# Patient Record
Sex: MALE | Race: BLACK OR AFRICAN AMERICAN | HISPANIC OR LATINO | URBAN - METROPOLITAN AREA
[De-identification: names, ages, dates, MRNs, and addresses within clinical notes are randomized per-mention and may not be internally consistent; named-entity substitution may affect disease eponyms.]

---

## 2017-11-09 VITALS
RESPIRATION RATE: 16 BRPM | HEART RATE: 81 BPM | OXYGEN SATURATION: 99 % | DIASTOLIC BLOOD PRESSURE: 80 MMHG | SYSTOLIC BLOOD PRESSURE: 160 MMHG

## 2017-11-09 NOTE — H&P ADULT - ASSESSMENT
Consented with use of  #[id number]  Risks & benefits of procedure and alternative therapy have been explained to the patient including but not limited to: allergic reaction, bleeding w/possible need for blood transfusion, infection, renal and vascular compromise, limb damage, arrhythmia, stroke, vessel dissection/perforation, Myocardial infarction, emergent CABG. Informed consent obtained and in chart. 78 yo Vincentian speaking male, former smoker, former heavy ETOH abuser (per Dr. Lynn's note-patient denies) PMHx HTN, Hyperlipidemia, +Pre-Diabetes, Aortic Valve Sclerosis, Mild to Moderate MR, chronic systolic CHF (EF 35%), COPD/Emphysema (no history of intubations or hospitalizations, no home Oxygen),  recent prostatitis tx with abx x 10 days, and left leg osteoarthritis who presents for recommended cardiac cath due to patient's risk factors, CCS Angina Class II Equivalent Symptoms, high risk stress test findings with severe Cardiomyopathy.    ASA III            Mallampati III    CBC stable. Patient loaded with  mg PO x 1 and Plavix 600 mg PO x 1.         Consented with use of  #[id number]  Risks & benefits of procedure and alternative therapy have been explained to the patient including but not limited to: allergic reaction, bleeding w/possible need for blood transfusion, infection, renal and vascular compromise, limb damage, arrhythmia, stroke, vessel dissection/perforation, Myocardial infarction, emergent CABG. Informed consent obtained and in chart. 76 yo Northern Irish speaking male, former smoker, former heavy ETOH abuser (per Dr. Lynn's note-patient denies) PMHx HTN, Hyperlipidemia, +Pre-Diabetes, Aortic Valve Sclerosis, Mild to Moderate MR, chronic systolic CHF (EF 35%), LBBB, COPD/Emphysema (no history of intubations or hospitalizations, no home Oxygen),  Prostatitis ~ 3 months ago tx with abx x 10 days, and left leg osteoarthritis (ambulates with cane/walker)who presents for recommended cardiac cath due to patient's risk factors, CCS Angina Class II Equivalent Symptoms, high risk stress test findings with severe Cardiomyopathy.    ASA III            Mallampati III    CBC stable. Patient loaded with  mg PO x 1 and Plavix 600 mg PO x 1.         Consented with use of  #[id number]  Risks & benefits of procedure and alternative therapy have been explained to the patient including but not limited to: allergic reaction, bleeding w/possible need for blood transfusion, infection, renal and vascular compromise, limb damage, arrhythmia, stroke, vessel dissection/perforation, Myocardial infarction, emergent CABG. Informed consent obtained and in chart.

## 2017-11-09 NOTE — H&P ADULT - NSHPLABSRESULTS_GEN_ALL_CORE
EKG: 13.3   6.5   )-----------( 176      ( 10 Nov 2017 09:28 )             38.9       11-10    138  |  103  |  13  ----------------------------<  121<H>  4.0   |  21<L>  |  1.16    Ca    10.5      10 Nov 2017 09:28    TPro  7.3  /  Alb  4.1  /  TBili  0.7  /  DBili  x   /  AST  16  /  ALT  13  /  AlkPhos  104  11-10      PT/INR - ( 10 Nov 2017 09:28 )   PT: 11.7 sec;   INR: 1.05          PTT - ( 10 Nov 2017 09:28 )  PTT:30.8 sec    CARDIAC MARKERS ( 10 Nov 2017 09:28 )  x     / x     / 62 U/L / x     / 1.3 ng/mL            EKG: 13.3   6.5   )-----------( 176      ( 10 Nov 2017 09:28 )             38.9       11-10    138  |  103  |  13  ----------------------------<  121<H>  4.0   |  21<L>  |  1.16    Ca    10.5      10 Nov 2017 09:28    TPro  7.3  /  Alb  4.1  /  TBili  0.7  /  DBili  x   /  AST  16  /  ALT  13  /  AlkPhos  104  11-10      PT/INR - ( 10 Nov 2017 09:28 )   PT: 11.7 sec;   INR: 1.05          PTT - ( 10 Nov 2017 09:28 )  PTT:30.8 sec    CARDIAC MARKERS ( 10 Nov 2017 09:28 )  x     / x     / 62 U/L / x     / 1.3 ng/mL            EKG: NSR with PACs and IVCD.

## 2017-11-09 NOTE — H&P ADULT - NEGATIVE NEUROLOGICAL SYMPTOMS
no tremors/no paresthesias/no syncope/no generalized seizures/no focal seizures/no weakness/no headache

## 2017-11-09 NOTE — H&P ADULT - NSHPSOCIALHISTORY_GEN_ALL_CORE
TOB: Quit >10 years ago. Used to smoke 1/2 ppd x20-25 years. Denies Illicit Drug Use  ETOH: Occasional per patient. Per Dr. Ortega's office note -former heavy ETOH abuser until 15 years ago

## 2017-11-09 NOTE — H&P ADULT - HISTORY OF PRESENT ILLNESS
SKELETON    THIS PT IS REGISTERED FOR 11/10/2019. Please have scheduling correct it.     78 yo male, current occasional smoker, PMHx HTN, Hyperlipidemia, chronic systolic CHF, Asthma, BPH, osteoarthritis presents to cardiologist endorsing dyspnea on exertion and left hip pain. History obtained from patient with help of  ID# [id number]    THIS PT IS REGISTERED FOR 11/10/2019. Please have scheduling correct it.     78 yo male, former smoker, PMHx HTN, Hyperlipidemia, chronic systolic CHF (EF 35%), Asthma, recent prostatitis tx with abx x 10 days, and left leg osteoarthritis presents to cardiologist endorsing dyspnea on exertion and left hip pain. THIS PT IS REGISTERED FOR 11/10/2019. Please have scheduling correct it.     76 yo Bengali speaking male, former smoker, former heavy ETOH abuser (per Dr. Lynn's note-patient denies) PMHx HTN, Hyperlipidemia, +Pre-Diabetes, Aortic Valve Sclerosis, chronic systolic CHF (EF 35%), COPD/Emphysema (no history of intubations or hospitalizations, no home Oxygen),  recent prostatitis tx with abx x 10 days, and left leg osteoarthritis who presented  to cardiologist c/o DE LA ROSA x ~ 6 months. Patient reports experiencing SOB after walking 2-3 blocks relieved with rest (CCS Angina Class II Equivalent Symptoms). Patient admits to occasional palpitations but denies C/P, dizziness, diaphoresis, N/V, syncope, LE edema, PND or orthopnea.     Echocardiogram 10/2017 revealed borderline concentric LVH and dilatation, diffuse hypokinesis/severe hypokinesis of mid to anterior distal septum and apex, diffuse hypokinesis of other segments, moderate to severe global dysfunction, estimated LVEF 35%, mild Diastolic Dysfunction, abnormal echo density at apex may represent organized, mural thrombus, mild thickening of MV leaflets with normal motion, mild Mitral annulus calcification, mild to moderate MR, aortic root dilatation, AV sclerosis with normal opening, trileaflet valve, left atrial enlargement, normal Right Atrium and RV, Trace TR, mild MA. 78 yo Faroese speaking male, former smoker, former heavy ETOH abuser (per Dr. Lynn's note-patient denies) PMHx HTN, Hyperlipidemia, +Pre-Diabetes, Aortic Valve Sclerosis, chronic systolic CHF (EF 35%), COPD/Emphysema (no history of intubations or hospitalizations, no home Oxygen),  recent prostatitis tx with abx x 10 days, and left leg osteoarthritis who presented  to cardiologist c/o DE LA ROSA x ~ 6 months. Patient reports experiencing SOB after walking 2-3 blocks relieved with rest (CCS Angina Class II Equivalent Symptoms). Patient admits to occasional palpitations but denies C/P, dizziness, diaphoresis, N/V, syncope, LE edema, PND or orthopnea.     Echocardiogram 10/2017 revealed borderline concentric LVH and dilatation, diffuse hypokinesis/severe hypokinesis of mid to anterior distal septum and apex, diffuse hypokinesis of other segments, moderate to severe global dysfunction, estimated LVEF 35%, mild Diastolic Dysfunction, abnormal echo density at apex may represent organized, mural thrombus, mild thickening of MV leaflets with normal motion, mild Mitral annulus calcification, mild to moderate MR, aortic root dilatation, AV sclerosis with normal opening, trileaflet valve, left atrial enlargement, normal Right Atrium and RV, Trace TR, mild CT. 78 yo Saudi Arabian speaking male, former smoker, former heavy ETOH abuser (per Dr. Lynn's note-patient denies) PMHx HTN, Hyperlipidemia, +Pre-Diabetes, Aortic Valve Sclerosis, Mild to Moderate MR, chronic systolic CHF (EF 35%), COPD/Emphysema (no history of intubations or hospitalizations, no home Oxygen),  recent prostatitis tx with abx x 10 days, and left leg osteoarthritis who presented  to cardiologist c/o DE LA ROSA x ~ 6 months. Patient reports experiencing SOB after walking 2-3 blocks relieved with rest (CCS Angina Class II Equivalent Symptoms). Patient admits to occasional palpitations but denies C/P, dizziness, diaphoresis, N/V, syncope, LE edema, PND or orthopnea.     Echocardiogram 10/2017 revealed borderline concentric LVH and dilatation, diffuse hypokinesis/severe hypokinesis of mid to anterior distal septum and apex, diffuse hypokinesis of other segments, moderate to severe global dysfunction, estimated LVEF 35%, mild Diastolic Dysfunction, abnormal echo density at apex may represent organized, mural thrombus, mild thickening of MV leaflets with normal motion, mild Mitral annulus calcification, mild to moderate MR, aortic root dilatation, AV sclerosis with normal opening, trileaflet valve, left atrial enlargement, normal Right Atrium and RV, Trace TR, mild LA.   Pharmacological Stress Test 11/2/17 revealed LVEF 28%, findings consistent with myocardial scarring involving mid to basal lateral wall with a significant ankit-infarct ischemia, akinesis of mid to basal lateral wall and there is moderate LV dilataton, normal RV size and wall motion. In light of patient's risk factors, CCS Angina Class II Equivalent Symptoms, high risk stress test findings with Cardiomyopathy patient now presents for recommended cardiac cath with possible intervention. History obtained from patient (reliable) daughter Clayton (reliable) and Dr. Ortega's office notes     76 yo Maori speaking male, former smoker, former heavy ETOH abuser (per Dr. Lynn's note-patient denies) PMHx HTN, Hyperlipidemia, +Pre-Diabetes, Aortic Valve Sclerosis, Mild to Moderate MR, chronic systolic CHF (EF 35%), LBBB, COPD/Emphysema (no history of intubations or hospitalizations, no home Oxygen),  Prostatitis ~ 3 months ago tx with abx x 10 days, and left leg osteoarthritis (ambulates with cane/walker) who presented  to cardiologist c/o DE LA ROSA x ~ 6 months. Patient reports experiencing SOB after walking 2-3 blocks relieved with rest (CCS Angina Class II Equivalent Symptoms). Patient admits to occasional palpitations but denies C/P, dizziness, diaphoresis, N/V, syncope, LE edema, PND or orthopnea.   Holter Monitor 2017 revealed rare PVCs, frequent SVES, short SVT longest 5 beats.   Echocardiogram 10/2017 revealed borderline concentric LVH and dilatation, diffuse hypokinesis/severe hypokinesis of mid to anterior distal septum and apex, diffuse hypokinesis of other segments, moderate to severe global dysfunction, estimated LVEF 35%, mild Diastolic Dysfunction, abnormal echo density at apex may represent organized, mural thrombus, mild thickening of MV leaflets with normal motion, mild Mitral annulus calcification, mild to moderate MR, aortic root dilatation, AV sclerosis with normal opening, trileaflet valve, left atrial enlargement, normal Right Atrium and RV, Trace TR, mild SC.   Pharmacological Stress Test 11/2/17 revealed LVEF 28%, findings consistent with myocardial scarring involving mid to basal lateral wall with a significant ankit-infarct ischemia, akinesis of mid to basal lateral wall and there is moderate LV dilataton, normal RV size and wall motion. In light of patient's risk factors, CCS Angina Class II Equivalent Symptoms, high risk stress test findings with Cardiomyopathy patient now presents for recommended cardiac cath with possible intervention.

## 2017-11-10 ENCOUNTER — INPATIENT (INPATIENT)
Facility: HOSPITAL | Age: 77
LOS: 0 days | Discharge: HOME | DRG: 247 | End: 2017-11-11
Attending: INTERNAL MEDICINE | Admitting: INTERNAL MEDICINE
Payer: MEDICARE

## 2017-11-10 DIAGNOSIS — Z98.890 OTHER SPECIFIED POSTPROCEDURAL STATES: Chronic | ICD-10-CM

## 2017-11-10 LAB
ALBUMIN SERPL ELPH-MCNC: 4.1 G/DL — SIGNIFICANT CHANGE UP (ref 3.3–5)
ALP SERPL-CCNC: 104 U/L — SIGNIFICANT CHANGE UP (ref 40–120)
ALT FLD-CCNC: 13 U/L — SIGNIFICANT CHANGE UP (ref 10–45)
ANION GAP SERPL CALC-SCNC: 14 MMOL/L — SIGNIFICANT CHANGE UP (ref 5–17)
APTT BLD: 30.8 SEC — SIGNIFICANT CHANGE UP (ref 27.5–37.4)
AST SERPL-CCNC: 16 U/L — SIGNIFICANT CHANGE UP (ref 10–40)
BASOPHILS NFR BLD AUTO: 0.6 % — SIGNIFICANT CHANGE UP (ref 0–2)
BILIRUB SERPL-MCNC: 0.7 MG/DL — SIGNIFICANT CHANGE UP (ref 0.2–1.2)
BUN SERPL-MCNC: 13 MG/DL — SIGNIFICANT CHANGE UP (ref 7–23)
CALCIUM SERPL-MCNC: 10.5 MG/DL — SIGNIFICANT CHANGE UP (ref 8.4–10.5)
CHLORIDE SERPL-SCNC: 103 MMOL/L — SIGNIFICANT CHANGE UP (ref 96–108)
CHOLEST SERPL-MCNC: 141 MG/DL — SIGNIFICANT CHANGE UP (ref 10–199)
CK MB CFR SERPL CALC: 1.3 NG/ML — SIGNIFICANT CHANGE UP (ref 0–6.7)
CK SERPL-CCNC: 62 U/L — SIGNIFICANT CHANGE UP (ref 30–200)
CO2 SERPL-SCNC: 21 MMOL/L — LOW (ref 22–31)
CREAT SERPL-MCNC: 1.16 MG/DL — SIGNIFICANT CHANGE UP (ref 0.5–1.3)
CRP SERPL-MCNC: 0.1 MG/DL — SIGNIFICANT CHANGE UP (ref 0–0.4)
EOSINOPHIL NFR BLD AUTO: 1.7 % — SIGNIFICANT CHANGE UP (ref 0–6)
GLUCOSE SERPL-MCNC: 121 MG/DL — HIGH (ref 70–99)
HBA1C BLD-MCNC: 5.8 % — HIGH (ref 4–5.6)
HCT VFR BLD CALC: 38.9 % — LOW (ref 39–50)
HDLC SERPL-MCNC: 40 MG/DL — SIGNIFICANT CHANGE UP (ref 40–125)
HGB BLD-MCNC: 13.3 G/DL — SIGNIFICANT CHANGE UP (ref 13–17)
INR BLD: 1.05 — SIGNIFICANT CHANGE UP (ref 0.88–1.16)
LIPID PNL WITH DIRECT LDL SERPL: 69 MG/DL — SIGNIFICANT CHANGE UP
LYMPHOCYTES # BLD AUTO: 29.2 % — SIGNIFICANT CHANGE UP (ref 13–44)
MCHC RBC-ENTMCNC: 28.7 PG — SIGNIFICANT CHANGE UP (ref 27–34)
MCHC RBC-ENTMCNC: 34.2 G/DL — SIGNIFICANT CHANGE UP (ref 32–36)
MCV RBC AUTO: 84 FL — SIGNIFICANT CHANGE UP (ref 80–100)
MONOCYTES NFR BLD AUTO: 4.5 % — SIGNIFICANT CHANGE UP (ref 2–14)
NEUTROPHILS NFR BLD AUTO: 64 % — SIGNIFICANT CHANGE UP (ref 43–77)
PLATELET # BLD AUTO: 176 K/UL — SIGNIFICANT CHANGE UP (ref 150–400)
POTASSIUM SERPL-MCNC: 4 MMOL/L — SIGNIFICANT CHANGE UP (ref 3.5–5.3)
POTASSIUM SERPL-SCNC: 4 MMOL/L — SIGNIFICANT CHANGE UP (ref 3.5–5.3)
PROT SERPL-MCNC: 7.3 G/DL — SIGNIFICANT CHANGE UP (ref 6–8.3)
PROTHROM AB SERPL-ACNC: 11.7 SEC — SIGNIFICANT CHANGE UP (ref 9.8–12.7)
RBC # BLD: 4.63 M/UL — SIGNIFICANT CHANGE UP (ref 4.2–5.8)
RBC # FLD: 13 % — SIGNIFICANT CHANGE UP (ref 10.3–16.9)
SODIUM SERPL-SCNC: 138 MMOL/L — SIGNIFICANT CHANGE UP (ref 135–145)
TOTAL CHOLESTEROL/HDL RATIO MEASUREMENT: 3.5 RATIO — SIGNIFICANT CHANGE UP (ref 3.4–9.6)
TRIGL SERPL-MCNC: 159 MG/DL — HIGH (ref 10–149)
WBC # BLD: 6.5 K/UL — SIGNIFICANT CHANGE UP (ref 3.8–10.5)
WBC # FLD AUTO: 6.5 K/UL — SIGNIFICANT CHANGE UP (ref 3.8–10.5)

## 2017-11-10 PROCEDURE — 93010 ELECTROCARDIOGRAM REPORT: CPT

## 2017-11-10 RX ORDER — ATORVASTATIN CALCIUM 80 MG/1
20 TABLET, FILM COATED ORAL AT BEDTIME
Qty: 0 | Refills: 0 | Status: DISCONTINUED | OUTPATIENT
Start: 2017-11-10 | End: 2017-11-11

## 2017-11-10 RX ORDER — CLOPIDOGREL BISULFATE 75 MG/1
600 TABLET, FILM COATED ORAL ONCE
Qty: 0 | Refills: 0 | Status: COMPLETED | OUTPATIENT
Start: 2017-11-10 | End: 2017-11-10

## 2017-11-10 RX ORDER — SODIUM CHLORIDE 9 MG/ML
500 INJECTION, SOLUTION INTRAVENOUS
Qty: 0 | Refills: 0 | Status: DISCONTINUED | OUTPATIENT
Start: 2017-11-10 | End: 2017-11-10

## 2017-11-10 RX ORDER — DOCUSATE SODIUM 100 MG
100 CAPSULE ORAL DAILY
Qty: 0 | Refills: 0 | Status: DISCONTINUED | OUTPATIENT
Start: 2017-11-10 | End: 2017-11-11

## 2017-11-10 RX ORDER — ASPIRIN/CALCIUM CARB/MAGNESIUM 324 MG
81 TABLET ORAL DAILY
Qty: 0 | Refills: 0 | Status: DISCONTINUED | OUTPATIENT
Start: 2017-11-11 | End: 2017-11-11

## 2017-11-10 RX ORDER — CHLORHEXIDINE GLUCONATE 213 G/1000ML
1 SOLUTION TOPICAL ONCE
Qty: 0 | Refills: 0 | Status: DISCONTINUED | OUTPATIENT
Start: 2017-11-10 | End: 2017-11-10

## 2017-11-10 RX ORDER — METOPROLOL TARTRATE 50 MG
25 TABLET ORAL DAILY
Qty: 0 | Refills: 0 | Status: DISCONTINUED | OUTPATIENT
Start: 2017-11-10 | End: 2017-11-11

## 2017-11-10 RX ORDER — SENNA PLUS 8.6 MG/1
2 TABLET ORAL AT BEDTIME
Qty: 0 | Refills: 0 | Status: DISCONTINUED | OUTPATIENT
Start: 2017-11-10 | End: 2017-11-11

## 2017-11-10 RX ORDER — SODIUM CHLORIDE 9 MG/ML
500 INJECTION INTRAMUSCULAR; INTRAVENOUS; SUBCUTANEOUS
Qty: 0 | Refills: 0 | Status: DISCONTINUED | OUTPATIENT
Start: 2017-11-10 | End: 2017-11-11

## 2017-11-10 RX ORDER — CLOPIDOGREL BISULFATE 75 MG/1
75 TABLET, FILM COATED ORAL DAILY
Qty: 0 | Refills: 0 | Status: DISCONTINUED | OUTPATIENT
Start: 2017-11-11 | End: 2017-11-11

## 2017-11-10 RX ORDER — VALSARTAN 80 MG/1
80 TABLET ORAL DAILY
Qty: 0 | Refills: 0 | Status: DISCONTINUED | OUTPATIENT
Start: 2017-11-10 | End: 2017-11-11

## 2017-11-10 RX ORDER — ISOSORBIDE MONONITRATE 60 MG/1
30 TABLET, EXTENDED RELEASE ORAL DAILY
Qty: 0 | Refills: 0 | Status: DISCONTINUED | OUTPATIENT
Start: 2017-11-10 | End: 2017-11-11

## 2017-11-10 RX ORDER — INFLUENZA VIRUS VACCINE 15; 15; 15; 15 UG/.5ML; UG/.5ML; UG/.5ML; UG/.5ML
0.5 SUSPENSION INTRAMUSCULAR ONCE
Qty: 0 | Refills: 0 | Status: COMPLETED | OUTPATIENT
Start: 2017-11-10 | End: 2017-11-10

## 2017-11-10 RX ORDER — ASPIRIN/CALCIUM CARB/MAGNESIUM 324 MG
325 TABLET ORAL ONCE
Qty: 0 | Refills: 0 | Status: COMPLETED | OUTPATIENT
Start: 2017-11-10 | End: 2017-11-10

## 2017-11-10 RX ADMIN — SODIUM CHLORIDE 40 MILLILITER(S): 9 INJECTION, SOLUTION INTRAVENOUS at 10:20

## 2017-11-10 RX ADMIN — ISOSORBIDE MONONITRATE 30 MILLIGRAM(S): 60 TABLET, EXTENDED RELEASE ORAL at 16:20

## 2017-11-10 RX ADMIN — Medication 100 MILLIGRAM(S): at 16:19

## 2017-11-10 RX ADMIN — Medication 25 MILLIGRAM(S): at 16:20

## 2017-11-10 RX ADMIN — CLOPIDOGREL BISULFATE 600 MILLIGRAM(S): 75 TABLET, FILM COATED ORAL at 10:15

## 2017-11-10 RX ADMIN — SENNA PLUS 2 TABLET(S): 8.6 TABLET ORAL at 22:16

## 2017-11-10 RX ADMIN — ATORVASTATIN CALCIUM 20 MILLIGRAM(S): 80 TABLET, FILM COATED ORAL at 22:16

## 2017-11-10 RX ADMIN — SODIUM CHLORIDE 50 MILLILITER(S): 9 INJECTION INTRAMUSCULAR; INTRAVENOUS; SUBCUTANEOUS at 16:09

## 2017-11-10 RX ADMIN — Medication 325 MILLIGRAM(S): at 10:15

## 2017-11-10 RX ADMIN — VALSARTAN 80 MILLIGRAM(S): 80 TABLET ORAL at 17:03

## 2017-11-11 VITALS
SYSTOLIC BLOOD PRESSURE: 132 MMHG | RESPIRATION RATE: 16 BRPM | OXYGEN SATURATION: 98 % | HEART RATE: 88 BPM | DIASTOLIC BLOOD PRESSURE: 76 MMHG

## 2017-11-11 LAB
ANION GAP SERPL CALC-SCNC: 13 MMOL/L — SIGNIFICANT CHANGE UP (ref 5–17)
BUN SERPL-MCNC: 13 MG/DL — SIGNIFICANT CHANGE UP (ref 7–23)
CALCIUM SERPL-MCNC: 10.1 MG/DL — SIGNIFICANT CHANGE UP (ref 8.4–10.5)
CHLORIDE SERPL-SCNC: 103 MMOL/L — SIGNIFICANT CHANGE UP (ref 96–108)
CO2 SERPL-SCNC: 21 MMOL/L — LOW (ref 22–31)
CREAT SERPL-MCNC: 1.08 MG/DL — SIGNIFICANT CHANGE UP (ref 0.5–1.3)
GLUCOSE SERPL-MCNC: 95 MG/DL — SIGNIFICANT CHANGE UP (ref 70–99)
HCT VFR BLD CALC: 36.2 % — LOW (ref 39–50)
HGB BLD-MCNC: 11.9 G/DL — LOW (ref 13–17)
MAGNESIUM SERPL-MCNC: 2.1 MG/DL — SIGNIFICANT CHANGE UP (ref 1.6–2.6)
MCHC RBC-ENTMCNC: 27.9 PG — SIGNIFICANT CHANGE UP (ref 27–34)
MCHC RBC-ENTMCNC: 32.9 G/DL — SIGNIFICANT CHANGE UP (ref 32–36)
MCV RBC AUTO: 84.8 FL — SIGNIFICANT CHANGE UP (ref 80–100)
PLATELET # BLD AUTO: 171 K/UL — SIGNIFICANT CHANGE UP (ref 150–400)
POTASSIUM SERPL-MCNC: 4 MMOL/L — SIGNIFICANT CHANGE UP (ref 3.5–5.3)
POTASSIUM SERPL-SCNC: 4 MMOL/L — SIGNIFICANT CHANGE UP (ref 3.5–5.3)
RBC # BLD: 4.27 M/UL — SIGNIFICANT CHANGE UP (ref 4.2–5.8)
RBC # FLD: 13.4 % — SIGNIFICANT CHANGE UP (ref 10.3–16.9)
SODIUM SERPL-SCNC: 137 MMOL/L — SIGNIFICANT CHANGE UP (ref 135–145)
WBC # BLD: 6 K/UL — SIGNIFICANT CHANGE UP (ref 3.8–10.5)
WBC # FLD AUTO: 6 K/UL — SIGNIFICANT CHANGE UP (ref 3.8–10.5)

## 2017-11-11 PROCEDURE — 93010 ELECTROCARDIOGRAM REPORT: CPT

## 2017-11-11 RX ORDER — ATORVASTATIN CALCIUM 80 MG/1
1 TABLET, FILM COATED ORAL
Qty: 30 | Refills: 3 | OUTPATIENT
Start: 2017-11-11 | End: 2018-03-10

## 2017-11-11 RX ORDER — ATORVASTATIN CALCIUM 80 MG/1
1 TABLET, FILM COATED ORAL
Qty: 0 | Refills: 0 | COMMUNITY

## 2017-11-11 RX ORDER — CLOPIDOGREL BISULFATE 75 MG/1
1 TABLET, FILM COATED ORAL
Qty: 30 | Refills: 11 | OUTPATIENT
Start: 2017-11-11 | End: 2018-11-05

## 2017-11-11 RX ADMIN — Medication 81 MILLIGRAM(S): at 11:41

## 2017-11-11 RX ADMIN — VALSARTAN 80 MILLIGRAM(S): 80 TABLET ORAL at 06:22

## 2017-11-11 RX ADMIN — ISOSORBIDE MONONITRATE 30 MILLIGRAM(S): 60 TABLET, EXTENDED RELEASE ORAL at 11:41

## 2017-11-11 RX ADMIN — CLOPIDOGREL BISULFATE 75 MILLIGRAM(S): 75 TABLET, FILM COATED ORAL at 11:41

## 2017-11-11 RX ADMIN — Medication 100 MILLIGRAM(S): at 11:41

## 2017-11-11 RX ADMIN — Medication 25 MILLIGRAM(S): at 06:22

## 2017-11-11 NOTE — DISCHARGE NOTE ADULT - CARE PROVIDER_API CALL
Adelso Lieberman), Cardiovascular Disease; Internal Medicine; Interventional Cardiology  [address] [place] [address]  Phone: [phone]  Fax: [phone]

## 2017-11-11 NOTE — DISCHARGE NOTE ADULT - CARE PLAN
Principal Discharge DX:	Coronary artery disease  Goal:	You have blockages in the blood vessels that give blood and oxygen to your heart. This is called CORONARY ARTERY DISEASE. You got a drug eluting stent to your proximal right coronary artery. You have another blockage in your left circumflex artery that you need to come back for another procedure in 5 weeks to get more stents. It is VERY IMPORTANT that you take Aspirin 81mg once daily and Plavix (Clopidogrel) 75mg once daily to keep your stent open. IF YOU DO NOT TAKE THESE MEDIATIONS EVERY SINGLE DAY YOUR STENT CAN CLOSE AND YOU CAN HAVE A HEART ATTACK AND POSSIBLY DIE.  Instructions for follow-up, activity and diet:	Right groin wound care: do not lift objects heavier than 5 lbs for 5 days. Observe for signs of bleeding which include bleeding/sudden swelling to your right groin site, new/worsening back pain, or numbness/tingling/pain/coolness to your right leg/foot/toes. If you experiences these symptoms call your doctor and go to the nearest ED immediately. Follow up with Dr. Ortega in 1-2 weeks.    PLEASE  YOUR PLAVIX (CLOPIDOGREL) 75MG ONCE DAILY PRESCRIPTION FROM YOUR PHARMACY TODAY. THEY ARE OPEN UNTIL 6PM AND ARE NOT OPEN TOMORROW.  Secondary Diagnosis:	Hypertension  Goal:	Continue home blood pressure medications.  Secondary Diagnosis:	Hyperlipidemia  Goal:	Continue home dose of cholesterol medication. Principal Discharge DX:	Coronary artery disease  Goal:	You have blockages in the blood vessels that give blood and oxygen to your heart. This is called CORONARY ARTERY DISEASE. You got a drug eluting stent to your proximal right coronary artery. You have another blockage in your left circumflex artery that you need to come back for another procedure in 5 weeks to get more stents. It is VERY IMPORTANT that you take Aspirin 81mg once daily and Plavix (Clopidogrel) 75mg once daily to keep your stent open. IF YOU DO NOT TAKE THESE MEDIATIONS EVERY SINGLE DAY YOUR STENT CAN CLOSE AND YOU CAN HAVE A HEART ATTACK AND POSSIBLY DIE.  Instructions for follow-up, activity and diet:	Right groin wound care: do not lift objects heavier than 5 lbs for 5 days. Observe for signs of bleeding which include bleeding/sudden swelling to your right groin site, new/worsening back pain, or numbness/tingling/pain/coolness to your right leg/foot/toes. If you experiences these symptoms call your doctor and go to the nearest ED immediately. Follow up with Dr. Ortega in 1-2 weeks.    PLEASE  YOUR PLAVIX (CLOPIDOGREL) 75MG ONCE DAILY PRESCRIPTION FROM YOUR PHARMACY TODAY. THEY ARE OPEN UNTIL 6PM AND ARE NOT OPEN TOMORROW.  Secondary Diagnosis:	Hypertension  Goal:	Continue home blood pressure medications.  Secondary Diagnosis:	Hyperlipidemia  Goal:	We are increasing the dose of your cholesterol medication called ATORVASTATIN. STOP Atorvastatin 20mg once daily at bedtime. START Atorvastatin 40mg once daily at bedtime.

## 2017-11-11 NOTE — DISCHARGE NOTE ADULT - HOSPITAL COURSE
76 yo Senegalese speaking male, former smoker, former heavy ETOH abuser (per Dr. Lynn's note-patient denies) PMHx HTN, Hyperlipidemia, +Pre-Diabetes, Aortic Valve Sclerosis, Mild to Moderate MR, chronic systolic CHF (EF 35%), LBBB, COPD/Emphysema (no history of intubations or hospitalizations, no home Oxygen),  Prostatitis ~ 3 months ago tx with abx x 10 days, and left leg osteoarthritis (ambulates with cane/walker) who presented  to cardiologist c/o DE LA ROSA x ~ 6 months. Patient reports experiencing SOB after walking 2-3 blocks relieved with rest (CCS Angina Class II Equivalent Symptoms). Patient admits to occasional palpitations but denies C/P, dizziness, diaphoresis, N/V, syncope, LE edema, PND or orthopnea. Pt now s/p cardiac cath on 11/10/17 with IVUS/PTCA/GILBERTO-->pRCA. EF:35% (by Echo 10/2017). Right groin PC. Staged PCI of Lcx  if clinically indicated in 5 weeks. On 11/11 AM pt's VSS, pt asymptomatic. pt had sinus bradycardia at 345am with PACs and an episode of sinus tachycardia to 120s at 8pm on 11/10. Pt's labs stable. Pt stable for discharge as per Dr. Barnett. Pt will be discharged on Aspirin 81mg daily/ Plavix 75mg daily/ Toprol XL 25mg daily/ Valsartan 80mg daily/ Atorvastatin 20mg daily at bedtime. Pt's LDL 69. Pt will follow up with Dr. Ortega in 1-2 weeks. 78 yo Nauruan speaking male, former smoker, former heavy ETOH abuser (per Dr. Lynn's note-patient denies) PMHx HTN, Hyperlipidemia, +Pre-Diabetes, Aortic Valve Sclerosis, Mild to Moderate MR, chronic systolic CHF (EF 35%), LBBB, COPD/Emphysema (no history of intubations or hospitalizations, no home Oxygen),  Prostatitis ~ 3 months ago tx with abx x 10 days, and left leg osteoarthritis (ambulates with cane/walker) who presented  to cardiologist c/o DE LA ROSA x ~ 6 months. Patient reports experiencing SOB after walking 2-3 blocks relieved with rest (CCS Angina Class II Equivalent Symptoms). Patient admits to occasional palpitations but denies C/P, dizziness, diaphoresis, N/V, syncope, LE edema, PND or orthopnea. Pt now s/p cardiac cath on 11/10/17 with IVUS/PTCA/GILBERTO-->pRCA. EF:35% (by Echo 10/2017). Right groin PC. Staged PCI of Lcx  if clinically indicated in 5 weeks. On 11/11 AM pt's VSS, pt asymptomatic. pt had sinus bradycardia at 345am with PACs and an episode of sinus tachycardia to 120s at 8pm on 11/10. Pt's labs stable. Pt stable for discharge as per Dr. Barnett. Pt will be discharged on Aspirin 81mg daily/ Plavix 75mg daily/ Toprol XL 25mg daily/ Valsartan 80mg daily/ Atorvastatin 40mg daily at bedtime. Pt will follow up with Dr. Ortega in 1-2 weeks.

## 2017-11-11 NOTE — DISCHARGE NOTE ADULT - PATIENT PORTAL LINK FT
“You can access the [facility] Patient Portal, offered by [facility], by registering with the following website: [email or web address]”

## 2017-11-11 NOTE — DISCHARGE NOTE ADULT - MEDICATION SUMMARY - MEDICATIONS TO TAKE
I will START or STAY ON the medications listed below when I get home from the hospital:    aspirin 81 mg oral tablet  -- 1 tab(s) by mouth once a day  -- Indication: For Coronary artery disease/KEEPING YOUR STENT OPEN    valsartan 80 mg oral tablet  -- 1 tab(s) by mouth once a day  -- Indication: For Blood pressure control    Imdur 30 mg oral tablet, extended release  -- 1 tab(s) by mouth once a day (in the morning)  -- Indication: For Chest pain    atorvastatin 20 mg oral tablet  -- 1 tab(s) by mouth once a day  -- Indication: For Cholesterol control    clopidogrel 75 mg oral tablet  -- 1 tab(s) by mouth once a day  -- Indication: For Coronary artery disease/KEEPING YOUR STENT OPEN    Toprol-XL 25 mg oral tablet, extended release  -- 1 tab(s) by mouth once a day  -- Indication: For Blood pressure control

## 2017-11-11 NOTE — DISCHARGE NOTE ADULT - PLAN OF CARE
You have blockages in the blood vessels that give blood and oxygen to your heart. This is called CORONARY ARTERY DISEASE. You got a drug eluting stent to your proximal right coronary artery. You have another blockage in your left circumflex artery that you need to come back for another procedure in 5 weeks to get more stents. It is VERY IMPORTANT that you take Aspirin 81mg once daily and Plavix (Clopidogrel) 75mg once daily to keep your stent open. IF YOU DO NOT TAKE THESE MEDIATIONS EVERY SINGLE DAY YOUR STENT CAN CLOSE AND YOU CAN HAVE A HEART ATTACK AND POSSIBLY DIE. Right groin wound care: do not lift objects heavier than 5 lbs for 5 days. Observe for signs of bleeding which include bleeding/sudden swelling to your right groin site, new/worsening back pain, or numbness/tingling/pain/coolness to your right leg/foot/toes. If you experiences these symptoms call your doctor and go to the nearest ED immediately. Follow up with Dr. Ortega in 1-2 weeks.    PLEASE  YOUR PLAVIX (CLOPIDOGREL) 75MG ONCE DAILY PRESCRIPTION FROM YOUR PHARMACY TODAY. THEY ARE OPEN UNTIL 6PM AND ARE NOT OPEN TOMORROW. Continue home blood pressure medications. Continue home dose of cholesterol medication. We are increasing the dose of your cholesterol medication called ATORVASTATIN. STOP Atorvastatin 20mg once daily at bedtime. START Atorvastatin 40mg once daily at bedtime.

## 2017-11-15 DIAGNOSIS — E78.5 HYPERLIPIDEMIA, UNSPECIFIED: ICD-10-CM

## 2017-11-15 DIAGNOSIS — I11.0 HYPERTENSIVE HEART DISEASE WITH HEART FAILURE: ICD-10-CM

## 2017-11-15 DIAGNOSIS — Z98.49 CATARACT EXTRACTION STATUS, UNSPECIFIED EYE: ICD-10-CM

## 2017-11-15 DIAGNOSIS — Z79.82 LONG TERM (CURRENT) USE OF ASPIRIN: ICD-10-CM

## 2017-11-15 DIAGNOSIS — I35.8 OTHER NONRHEUMATIC AORTIC VALVE DISORDERS: ICD-10-CM

## 2017-11-15 DIAGNOSIS — Z87.891 PERSONAL HISTORY OF NICOTINE DEPENDENCE: ICD-10-CM

## 2017-11-15 DIAGNOSIS — I44.7 LEFT BUNDLE-BRANCH BLOCK, UNSPECIFIED: ICD-10-CM

## 2017-11-15 DIAGNOSIS — I50.22 CHRONIC SYSTOLIC (CONGESTIVE) HEART FAILURE: ICD-10-CM

## 2017-11-15 DIAGNOSIS — R73.03 PREDIABETES: ICD-10-CM

## 2017-11-15 DIAGNOSIS — J44.9 CHRONIC OBSTRUCTIVE PULMONARY DISEASE, UNSPECIFIED: ICD-10-CM

## 2017-11-15 DIAGNOSIS — I42.9 CARDIOMYOPATHY, UNSPECIFIED: ICD-10-CM

## 2017-11-15 DIAGNOSIS — I25.82 CHRONIC TOTAL OCCLUSION OF CORONARY ARTERY: ICD-10-CM

## 2017-11-15 DIAGNOSIS — R06.02 SHORTNESS OF BREATH: ICD-10-CM

## 2017-11-15 DIAGNOSIS — I25.110 ATHEROSCLEROTIC HEART DISEASE OF NATIVE CORONARY ARTERY WITH UNSTABLE ANGINA PECTORIS: ICD-10-CM

## 2017-11-15 DIAGNOSIS — N41.9 INFLAMMATORY DISEASE OF PROSTATE, UNSPECIFIED: ICD-10-CM

## 2017-11-15 DIAGNOSIS — M19.072 PRIMARY OSTEOARTHRITIS, LEFT ANKLE AND FOOT: ICD-10-CM

## 2017-11-15 DIAGNOSIS — R00.0 TACHYCARDIA, UNSPECIFIED: ICD-10-CM

## 2017-11-15 DIAGNOSIS — F10.21 ALCOHOL DEPENDENCE, IN REMISSION: ICD-10-CM

## 2017-12-19 PROCEDURE — 36415 COLL VENOUS BLD VENIPUNCTURE: CPT

## 2017-12-19 PROCEDURE — 80061 LIPID PANEL: CPT

## 2017-12-19 PROCEDURE — 80048 BASIC METABOLIC PNL TOTAL CA: CPT

## 2017-12-19 PROCEDURE — C1874: CPT

## 2017-12-19 PROCEDURE — C1894: CPT

## 2017-12-19 PROCEDURE — C1753: CPT

## 2017-12-19 PROCEDURE — 85610 PROTHROMBIN TIME: CPT

## 2017-12-19 PROCEDURE — C1760: CPT

## 2017-12-19 PROCEDURE — C1769: CPT

## 2017-12-19 PROCEDURE — C1887: CPT

## 2017-12-19 PROCEDURE — C1889: CPT

## 2017-12-19 PROCEDURE — 83036 HEMOGLOBIN GLYCOSYLATED A1C: CPT

## 2017-12-19 PROCEDURE — 82550 ASSAY OF CK (CPK): CPT

## 2017-12-19 PROCEDURE — 85025 COMPLETE CBC W/AUTO DIFF WBC: CPT

## 2017-12-19 PROCEDURE — C1725: CPT

## 2017-12-19 PROCEDURE — 86140 C-REACTIVE PROTEIN: CPT

## 2017-12-19 PROCEDURE — 85027 COMPLETE CBC AUTOMATED: CPT

## 2017-12-19 PROCEDURE — 80053 COMPREHEN METABOLIC PANEL: CPT

## 2017-12-19 PROCEDURE — 82553 CREATINE MB FRACTION: CPT

## 2017-12-19 PROCEDURE — 85730 THROMBOPLASTIN TIME PARTIAL: CPT

## 2017-12-19 PROCEDURE — 83735 ASSAY OF MAGNESIUM: CPT

## 2017-12-19 PROCEDURE — 93005 ELECTROCARDIOGRAM TRACING: CPT

## 2018-08-14 VITALS
DIASTOLIC BLOOD PRESSURE: 51 MMHG | SYSTOLIC BLOOD PRESSURE: 158 MMHG | TEMPERATURE: 98 F | OXYGEN SATURATION: 98 % | HEART RATE: 76 BPM

## 2018-08-14 NOTE — H&P ADULT - NSHPLABSRESULTS_GEN_ALL_CORE
13.1   5.5   )-----------( 157      ( 16 Aug 2018 09:17 )             39.4       08-16    140  |  104  |  18  ----------------------------<  137<H>  4.5   |  22  |  1.31<H>    Ca    10.4      16 Aug 2018 09:17        PT/INR - ( 16 Aug 2018 09:17 )   PT: 11.3 sec;   INR: 1.02          PTT - ( 16 Aug 2018 09:17 )  PTT:29.0 sec              EKG: 13.1   5.5   )-----------( 157      ( 16 Aug 2018 09:17 )             39.4       08-16    140  |  104  |  18  ----------------------------<  137<H>  4.5   |  22  |  1.31<H>    Ca    10.4      16 Aug 2018 09:17        PT/INR - ( 16 Aug 2018 09:17 )   PT: 11.3 sec;   INR: 1.02          PTT - ( 16 Aug 2018 09:17 )  PTT:29.0 sec              EKG: SR with LBBB

## 2018-08-14 NOTE — H&P ADULT - NSHPSOCIALHISTORY_GEN_ALL_CORE
TOB: Quit >10 years ago. Used to smoke 1/2 ppd x20-25 years. Denies Illicit Drug Use  ETOH: former heavy ETOH abuser until 7-8 years ago  Denies IVDU

## 2018-08-14 NOTE — H&P ADULT - PMH
CAD (coronary artery disease)    CHF (congestive heart failure)    COPD (chronic obstructive pulmonary disease)    HLD (hyperlipidemia)    HTN (hypertension)

## 2018-08-14 NOTE — H&P ADULT - HISTORY OF PRESENT ILLNESS
***History obtained from patient via  # [id number]***    76 yo Zambian speaking male, former smoker, former ETOH abuser, with PMHx significant for HTN, Hyperlipidemia, Pre-Diabetes, Aortic Valve Sclerosis, Mild to Moderate MR, chronic systolic CHF (EF 35%), LBBB, COPD/Emphysema (no history of intubations or hospitalizations, no home Oxygen),  known CAD with prior PCI/GILBERTO of RCA on 11/10/17 @ [facility] and residual  of LCx (see below), who presented to his cardiologist for routine follow-up.  Pt denies any chest pain, SOB, dizziness, n/v, diaphoresis, orthopnea, PND, palpitations, syncope.  Subsequently, pt underwent a stress test which was abnormal as per pt.      In light of pt's risk factors, known CAD and recent positive stress test, he is recommended for cardiac cath with possible intervention for suspected CAD progression.     CATH 11/10/17 @ [facility]: LM nl, mild LAD <20, pCx 100, pRCA 70, Successful PCI/GILBERTO of pRCA.  Pt to return for evaluationfor  intervention.    Echocardiogram 10/2017 revealed borderline concentric LVH and dilatation, diffuse hypokinesis/severe hypokinesis of mid to anterior distal septum and apex, diffuse hypokinesis of other segments, moderate to severe global dysfunction, estimated LVEF 35%, mild Diastolic Dysfunction, abnormal echo density at apex may represent organized, mural thrombus, mild thickening of MV leaflets with normal motion, mild Mitral annulus calcification, mild to moderate MR, aortic root dilatation, AV sclerosis with normal opening, trileaflet valve, left atrial enlargement, normal Right Atrium and RV, Trace TR, mild [place].   Pharmacological Stress Test 11/2/17 revealed LVEF 28%, findings consistent with myocardial scarring involving mid to basal lateral wall with a significant ankit-infarct ischemia, akinesis of mid to basal lateral wall and there is moderate LV dilataton, normal RV size and wall motion. ***History obtained from patient via  # [id number]***    78 yo Malaysian speaking male, former smoker, former ETOH abuser, with PMHx significant for HTN, Hyperlipidemia, Pre-Diabetes, Aortic Valve Sclerosis, Mild to Moderate MR, chronic systolic CHF (EF 35%), LBBB, COPD/Emphysema (no history of intubations or hospitalizations, no home Oxygen),  known CAD with prior PCI/GILBERTO of RCA on 11/10/17 @ [facility] and residual  of LCx (see below), who presented to his cardiologist for routine follow-up.  Pt denies any chest pain, SOB, dizziness, n/v, diaphoresis, orthopnea, PND, palpitations, syncope.  Subsequently, pt underwent a stress test on 7/9/18 which revealed mid to basal lateral wall ankit-infarct ischemia, EF 36%.      In light of pt's risk factors, known CAD and recent positive stress test, he is recommended for cardiac cath with possible intervention for suspected CAD progression.     CATH 11/10/17 @ [facility]: LM nl, mild LAD <20, pCx 100, pRCA 70, Successful PCI/GILBERTO of pRCA.  Pt to return for evaluationfor  intervention.    Echocardiogram 10/2017 revealed borderline concentric LVH and dilatation, diffuse hypokinesis/severe hypokinesis of mid to anterior distal septum and apex, diffuse hypokinesis of other segments, moderate to severe global dysfunction, estimated LVEF 35%, mild Diastolic Dysfunction, abnormal echo density at apex may represent organized, mural thrombus, mild thickening of MV leaflets with normal motion, mild Mitral annulus calcification, mild to moderate MR, aortic root dilatation, AV sclerosis with normal opening, trileaflet valve, left atrial enlargement, normal Right Atrium and RV, Trace TR, mild [place].   Pharmacological Stress Test 11/2/17 revealed LVEF 28%, findings consistent with myocardial scarring involving mid to basal lateral wall with a significant ankit-infarct ischemia, akinesis of mid to basal lateral wall and there is moderate LV dilataton, normal RV size and wall motion. ***History obtained from patient via  # [id number]***    76 yo Liberian speaking male, former smoker, former ETOH abuser, with PMHx significant for HTN, Hyperlipidemia, Pre-Diabetes, CKD Stage III -Cr 1.31 on arrival previously 1.08-1.16 in 11/2017), Aortic Valve Sclerosis, Mild to Moderate MR, chronic systolic CHF (EF 35%), LBBB, COPD/Emphysema (no history of intubations or hospitalizations, no home Oxygen),  known CAD with prior PCI/GILBERTO of RCA on 11/10/17 @ [facility] and residual  of LCx (see below), who presented to his cardiologist for routine follow-up.  Pt denies any chest pain, SOB, dizziness, n/v, diaphoresis, orthopnea, PND, palpitations, syncope.  Subsequently, pt underwent a stress test on 7/9/18 which revealed mid to basal lateral wall ankit-infarct ischemia, EF 36%.      In light of pt's risk factors, known CAD and recent positive stress test, he is recommended for cardiac cath with possible intervention for suspected CAD progression.     CATH 11/10/17 @ [facility]: LM nl, mild LAD <20, pCx 100, pRCA 70, Successful PCI/GILBERTO of pRCA.  Pt to return for evaluationfor  intervention.    Echocardiogram 10/2017 revealed borderline concentric LVH and dilatation, diffuse hypokinesis/severe hypokinesis of mid to anterior distal septum and apex, diffuse hypokinesis of other segments, moderate to severe global dysfunction, estimated LVEF 35%, mild Diastolic Dysfunction, abnormal echo density at apex may represent organized, mural thrombus, mild thickening of MV leaflets with normal motion, mild Mitral annulus calcification, mild to moderate MR, aortic root dilatation, AV sclerosis with normal opening, trileaflet valve, left atrial enlargement, normal Right Atrium and RV, Trace TR, mild MS.   Pharmacological Stress Test 11/2/17 revealed LVEF 28%, findings consistent with myocardial scarring involving mid to basal lateral wall with a significant ankit-infarct ischemia, akinesis of mid to basal lateral wall and there is moderate LV dilatation, normal RV size and wall motion.

## 2018-08-14 NOTE — H&P ADULT - ASSESSMENT
78 yo Estonian speaking male, former smoker, former ETOH abuser, with PMHx significant for HTN, Hyperlipidemia, Pre-Diabetes, CKD Stage III -Cr 1.31 on arrival previously 1.08-1.16 in 11/2017), Aortic Valve Sclerosis, Mild to Moderate MR, chronic systolic CHF (EF 35%), LBBB, COPD/Emphysema (no history of intubations or hospitalizations, no home Oxygen),  known CAD with prior PCI/GILBERTO of RCA on 11/10/17 @ [facility] and residual  of LCx who presents for recommended cardiac cath due to patient's risk factors, known  LAD, and abnormal stress test.     ASA III                    Mallampati III  Risks & benefits of procedure and alternative therapy have been explained to the patient including but not limited to: allergic reaction, bleeding w/possible need for blood transfusion, infection, renal and vascular compromise, limb damage, arrhythmia, stroke, vessel dissection/perforation, Myocardial infarction, emergent CABG. Informed consent obtained and in chart.     Patient is on ASA 81 mg PO Daily and Plavix 75 mg PO Daily and reports compliance however patient did not take doses today. ASA  mg PO x 1 and Plavix 75 mg PO x 1 given prior to procedure.      History of Chronic Systolic CHF-EF 35%. Patient euvolemic on exam. No JVD. Lungs CTA. No edema. No respiratory distress and able to lay flat without issue. O2 sat normal. 1/2 NS 40 cc/hr ordered. CKD Stage III-Cr 1.31 previously patient with CKD Stage II in 11/2017- Cr 1.08-1.16 with GFR 60-65. Patient did not take his Losartan today. Dr. Jordan saul.

## 2018-08-16 ENCOUNTER — INPATIENT (INPATIENT)
Facility: HOSPITAL | Age: 78
LOS: 0 days | Discharge: HOME | DRG: 247 | End: 2018-08-17
Attending: INTERNAL MEDICINE | Admitting: INTERNAL MEDICINE
Payer: MEDICARE

## 2018-08-16 DIAGNOSIS — Z98.890 OTHER SPECIFIED POSTPROCEDURAL STATES: Chronic | ICD-10-CM

## 2018-08-16 LAB
ANION GAP SERPL CALC-SCNC: 14 MMOL/L — SIGNIFICANT CHANGE UP (ref 5–17)
APTT BLD: 29 SEC — SIGNIFICANT CHANGE UP (ref 27.5–37.4)
BASOPHILS NFR BLD AUTO: 1.1 % — SIGNIFICANT CHANGE UP (ref 0–2)
BUN SERPL-MCNC: 18 MG/DL — SIGNIFICANT CHANGE UP (ref 7–23)
CALCIUM SERPL-MCNC: 10.4 MG/DL — SIGNIFICANT CHANGE UP (ref 8.4–10.5)
CHLORIDE SERPL-SCNC: 104 MMOL/L — SIGNIFICANT CHANGE UP (ref 96–108)
CHOLEST SERPL-MCNC: 149 MG/DL — SIGNIFICANT CHANGE UP (ref 10–199)
CK MB CFR SERPL CALC: 3.1 NG/ML — SIGNIFICANT CHANGE UP (ref 0–6.7)
CK SERPL-CCNC: 211 U/L — HIGH (ref 30–200)
CO2 SERPL-SCNC: 22 MMOL/L — SIGNIFICANT CHANGE UP (ref 22–31)
CREAT SERPL-MCNC: 1.31 MG/DL — HIGH (ref 0.5–1.3)
CRP SERPL-MCNC: <0.03 MG/DL — SIGNIFICANT CHANGE UP (ref 0–0.4)
EOSINOPHIL NFR BLD AUTO: 1.3 % — SIGNIFICANT CHANGE UP (ref 0–6)
GLUCOSE SERPL-MCNC: 137 MG/DL — HIGH (ref 70–99)
HBA1C BLD-MCNC: 6.3 % — HIGH (ref 4–5.6)
HCT VFR BLD CALC: 39.4 % — SIGNIFICANT CHANGE UP (ref 39–50)
HDLC SERPL-MCNC: 42 MG/DL — SIGNIFICANT CHANGE UP
HGB BLD-MCNC: 13.1 G/DL — SIGNIFICANT CHANGE UP (ref 13–17)
INR BLD: 1.02 — SIGNIFICANT CHANGE UP (ref 0.88–1.16)
LIPID PNL WITH DIRECT LDL SERPL: 67 MG/DL — SIGNIFICANT CHANGE UP
LYMPHOCYTES # BLD AUTO: 32 % — SIGNIFICANT CHANGE UP (ref 13–44)
MCHC RBC-ENTMCNC: 28.4 PG — SIGNIFICANT CHANGE UP (ref 27–34)
MCHC RBC-ENTMCNC: 33.2 G/DL — SIGNIFICANT CHANGE UP (ref 32–36)
MCV RBC AUTO: 85.5 FL — SIGNIFICANT CHANGE UP (ref 80–100)
MONOCYTES NFR BLD AUTO: 6.9 % — SIGNIFICANT CHANGE UP (ref 2–14)
NEUTROPHILS NFR BLD AUTO: 58.7 % — SIGNIFICANT CHANGE UP (ref 43–77)
PLATELET # BLD AUTO: 157 K/UL — SIGNIFICANT CHANGE UP (ref 150–400)
POTASSIUM SERPL-MCNC: 4.5 MMOL/L — SIGNIFICANT CHANGE UP (ref 3.5–5.3)
POTASSIUM SERPL-SCNC: 4.5 MMOL/L — SIGNIFICANT CHANGE UP (ref 3.5–5.3)
PROTHROM AB SERPL-ACNC: 11.3 SEC — SIGNIFICANT CHANGE UP (ref 9.8–12.7)
RBC # BLD: 4.61 M/UL — SIGNIFICANT CHANGE UP (ref 4.2–5.8)
RBC # FLD: 13.2 % — SIGNIFICANT CHANGE UP (ref 10.3–16.9)
SODIUM SERPL-SCNC: 140 MMOL/L — SIGNIFICANT CHANGE UP (ref 135–145)
TOTAL CHOLESTEROL/HDL RATIO MEASUREMENT: 3.5 RATIO — SIGNIFICANT CHANGE UP (ref 3.4–9.6)
TRIGL SERPL-MCNC: 198 MG/DL — HIGH (ref 10–149)
WBC # BLD: 5.5 K/UL — SIGNIFICANT CHANGE UP (ref 3.8–10.5)
WBC # FLD AUTO: 5.5 K/UL — SIGNIFICANT CHANGE UP (ref 3.8–10.5)

## 2018-08-16 PROCEDURE — 93010 ELECTROCARDIOGRAM REPORT: CPT

## 2018-08-16 PROCEDURE — 92928 PRQ TCAT PLMT NTRAC ST 1 LES: CPT | Mod: LC

## 2018-08-16 PROCEDURE — 93458 L HRT ARTERY/VENTRICLE ANGIO: CPT | Mod: 26,XU

## 2018-08-16 RX ORDER — METOPROLOL TARTRATE 50 MG
1 TABLET ORAL
Qty: 0 | Refills: 0 | COMMUNITY

## 2018-08-16 RX ORDER — VALSARTAN 80 MG/1
1 TABLET ORAL
Qty: 0 | Refills: 0 | COMMUNITY

## 2018-08-16 RX ORDER — ATORVASTATIN CALCIUM 80 MG/1
80 TABLET, FILM COATED ORAL AT BEDTIME
Qty: 0 | Refills: 0 | Status: DISCONTINUED | OUTPATIENT
Start: 2018-08-16 | End: 2018-08-17

## 2018-08-16 RX ORDER — CLOPIDOGREL BISULFATE 75 MG/1
75 TABLET, FILM COATED ORAL ONCE
Qty: 0 | Refills: 0 | Status: COMPLETED | OUTPATIENT
Start: 2018-08-16 | End: 2018-08-16

## 2018-08-16 RX ORDER — ASPIRIN/CALCIUM CARB/MAGNESIUM 324 MG
325 TABLET ORAL ONCE
Qty: 0 | Refills: 0 | Status: COMPLETED | OUTPATIENT
Start: 2018-08-16 | End: 2018-08-16

## 2018-08-16 RX ORDER — CLOPIDOGREL BISULFATE 75 MG/1
75 TABLET, FILM COATED ORAL DAILY
Qty: 0 | Refills: 0 | Status: DISCONTINUED | OUTPATIENT
Start: 2018-08-17 | End: 2018-08-17

## 2018-08-16 RX ORDER — SODIUM CHLORIDE 9 MG/ML
500 INJECTION INTRAMUSCULAR; INTRAVENOUS; SUBCUTANEOUS
Qty: 0 | Refills: 0 | Status: DISCONTINUED | OUTPATIENT
Start: 2018-08-16 | End: 2018-08-17

## 2018-08-16 RX ORDER — METOPROLOL TARTRATE 50 MG
50 TABLET ORAL DAILY
Qty: 0 | Refills: 0 | Status: DISCONTINUED | OUTPATIENT
Start: 2018-08-16 | End: 2018-08-17

## 2018-08-16 RX ORDER — SODIUM CHLORIDE 9 MG/ML
500 INJECTION, SOLUTION INTRAVENOUS
Qty: 0 | Refills: 0 | Status: DISCONTINUED | OUTPATIENT
Start: 2018-08-16 | End: 2018-08-16

## 2018-08-16 RX ORDER — ISOSORBIDE MONONITRATE 60 MG/1
30 TABLET, EXTENDED RELEASE ORAL DAILY
Qty: 0 | Refills: 0 | Status: DISCONTINUED | OUTPATIENT
Start: 2018-08-16 | End: 2018-08-17

## 2018-08-16 RX ORDER — ASPIRIN/CALCIUM CARB/MAGNESIUM 324 MG
81 TABLET ORAL DAILY
Qty: 0 | Refills: 0 | Status: DISCONTINUED | OUTPATIENT
Start: 2018-08-17 | End: 2018-08-17

## 2018-08-16 RX ADMIN — SODIUM CHLORIDE 40 MILLILITER(S): 9 INJECTION, SOLUTION INTRAVENOUS at 10:07

## 2018-08-16 RX ADMIN — CLOPIDOGREL BISULFATE 75 MILLIGRAM(S): 75 TABLET, FILM COATED ORAL at 10:07

## 2018-08-16 RX ADMIN — Medication 50 MILLIGRAM(S): at 19:26

## 2018-08-16 RX ADMIN — ATORVASTATIN CALCIUM 80 MILLIGRAM(S): 80 TABLET, FILM COATED ORAL at 22:12

## 2018-08-16 RX ADMIN — Medication 325 MILLIGRAM(S): at 10:09

## 2018-08-16 RX ADMIN — ISOSORBIDE MONONITRATE 30 MILLIGRAM(S): 60 TABLET, EXTENDED RELEASE ORAL at 19:26

## 2018-08-17 VITALS — WEIGHT: 157.41 LBS

## 2018-08-17 LAB
ANION GAP SERPL CALC-SCNC: 8 MMOL/L — SIGNIFICANT CHANGE UP (ref 5–17)
BUN SERPL-MCNC: 17 MG/DL — SIGNIFICANT CHANGE UP (ref 7–23)
CALCIUM SERPL-MCNC: 10.1 MG/DL — SIGNIFICANT CHANGE UP (ref 8.4–10.5)
CHLORIDE SERPL-SCNC: 105 MMOL/L — SIGNIFICANT CHANGE UP (ref 96–108)
CO2 SERPL-SCNC: 26 MMOL/L — SIGNIFICANT CHANGE UP (ref 22–31)
CREAT SERPL-MCNC: 1.21 MG/DL — SIGNIFICANT CHANGE UP (ref 0.5–1.3)
GLUCOSE SERPL-MCNC: 117 MG/DL — HIGH (ref 70–99)
HCT VFR BLD CALC: 37.4 % — LOW (ref 39–50)
HGB BLD-MCNC: 12.2 G/DL — LOW (ref 13–17)
MAGNESIUM SERPL-MCNC: 2.2 MG/DL — SIGNIFICANT CHANGE UP (ref 1.6–2.6)
MCHC RBC-ENTMCNC: 28.1 PG — SIGNIFICANT CHANGE UP (ref 27–34)
MCHC RBC-ENTMCNC: 32.6 G/DL — SIGNIFICANT CHANGE UP (ref 32–36)
MCV RBC AUTO: 86.2 FL — SIGNIFICANT CHANGE UP (ref 80–100)
PLATELET # BLD AUTO: 145 K/UL — LOW (ref 150–400)
POTASSIUM SERPL-MCNC: 5.3 MMOL/L — SIGNIFICANT CHANGE UP (ref 3.5–5.3)
POTASSIUM SERPL-SCNC: 5.3 MMOL/L — SIGNIFICANT CHANGE UP (ref 3.5–5.3)
RBC # BLD: 4.34 M/UL — SIGNIFICANT CHANGE UP (ref 4.2–5.8)
RBC # FLD: 13.5 % — SIGNIFICANT CHANGE UP (ref 10.3–16.9)
SODIUM SERPL-SCNC: 139 MMOL/L — SIGNIFICANT CHANGE UP (ref 135–145)
WBC # BLD: 6.3 K/UL — SIGNIFICANT CHANGE UP (ref 3.8–10.5)
WBC # FLD AUTO: 6.3 K/UL — SIGNIFICANT CHANGE UP (ref 3.8–10.5)

## 2018-08-17 PROCEDURE — 99231 SBSQ HOSP IP/OBS SF/LOW 25: CPT | Mod: 25

## 2018-08-17 PROCEDURE — 99239 HOSP IP/OBS DSCHRG MGMT >30: CPT

## 2018-08-17 PROCEDURE — 93010 ELECTROCARDIOGRAM REPORT: CPT

## 2018-08-17 RX ORDER — CLOPIDOGREL BISULFATE 75 MG/1
1 TABLET, FILM COATED ORAL
Qty: 30 | Refills: 11 | OUTPATIENT
Start: 2018-08-17 | End: 2019-08-11

## 2018-08-17 RX ORDER — ATORVASTATIN CALCIUM 80 MG/1
1 TABLET, FILM COATED ORAL
Qty: 30 | Refills: 2 | OUTPATIENT
Start: 2018-08-17 | End: 2018-11-14

## 2018-08-17 RX ORDER — ASPIRIN/CALCIUM CARB/MAGNESIUM 324 MG
1 TABLET ORAL
Qty: 30 | Refills: 11 | OUTPATIENT
Start: 2018-08-17 | End: 2019-08-11

## 2018-08-17 RX ORDER — ISOSORBIDE MONONITRATE 60 MG/1
1 TABLET, EXTENDED RELEASE ORAL
Qty: 0 | Refills: 0 | COMMUNITY

## 2018-08-17 RX ORDER — LOSARTAN POTASSIUM 100 MG/1
1 TABLET, FILM COATED ORAL
Qty: 0 | Refills: 0 | COMMUNITY

## 2018-08-17 RX ORDER — ATORVASTATIN CALCIUM 80 MG/1
1 TABLET, FILM COATED ORAL
Qty: 0 | Refills: 0 | COMMUNITY

## 2018-08-17 RX ORDER — ASPIRIN/CALCIUM CARB/MAGNESIUM 324 MG
1 TABLET ORAL
Qty: 0 | Refills: 0 | COMMUNITY

## 2018-08-17 RX ORDER — METOPROLOL TARTRATE 50 MG
1 TABLET ORAL
Qty: 30 | Refills: 2 | OUTPATIENT
Start: 2018-08-17 | End: 2018-11-14

## 2018-08-17 RX ORDER — METOPROLOL TARTRATE 50 MG
1 TABLET ORAL
Qty: 0 | Refills: 0 | COMMUNITY

## 2018-08-17 RX ORDER — ISOSORBIDE MONONITRATE 60 MG/1
1 TABLET, EXTENDED RELEASE ORAL
Qty: 30 | Refills: 2 | OUTPATIENT
Start: 2018-08-17 | End: 2018-11-14

## 2018-08-17 RX ORDER — LOSARTAN POTASSIUM 100 MG/1
1 TABLET, FILM COATED ORAL
Qty: 30 | Refills: 2 | OUTPATIENT
Start: 2018-08-17 | End: 2018-11-14

## 2018-08-17 RX ADMIN — Medication 81 MILLIGRAM(S): at 11:09

## 2018-08-17 RX ADMIN — CLOPIDOGREL BISULFATE 75 MILLIGRAM(S): 75 TABLET, FILM COATED ORAL at 11:09

## 2018-08-17 RX ADMIN — ISOSORBIDE MONONITRATE 30 MILLIGRAM(S): 60 TABLET, EXTENDED RELEASE ORAL at 11:09

## 2018-08-17 RX ADMIN — Medication 50 MILLIGRAM(S): at 06:04

## 2018-08-17 NOTE — PROGRESS NOTE ADULT - SUBJECTIVE AND OBJECTIVE BOX
INTERVENTIONAL CARDIOLOGY FOLLOW UP NOTE    -Patient seen and examined this am  -No events overnight  -No complaints this am    VASCULAR ACCESS EXAM:     FEMORAL:   2+ right femoral pulse  2+ DP/PT pulses.  -Access site clean, non-tender, without ecchymosis or hematoma.    A/P  78 yo M with PMH HTN, HLD, CKD stage 3, prior CAD with a prior cath in 11/2017 (GILBERTO of RCA and  of pLCX) presents with a positive stress test and is now s/p PCI with GILBERTO  pLCx . Prior mRCA stent is patent. via femoral approach with no evidence of vascular complications post procedure.     -continue with current medications including dual antiplatelet therapy   -discharge instructions as per protocol   -outpatient follow up with primary cardiologist

## 2018-08-17 NOTE — DISCHARGE NOTE ADULT - CARE PLAN
Principal Discharge DX:	CAD (coronary artery disease)  Goal:	Maintain Low Fat, Low Salt and Low Cholesterol diet  Assessment and plan of treatment:	You had a cardiac angiogram with stent placement to one of your heart arteries (Left Circumflex). Continue Aspirin and Plavix . DO NOT STOP TAKING ASPIRIN OR PLAVIX UNLESS INSTRUCTED BY YOUR CARDIOLOGIST AS YOUR STENT CAN CLOSE RESULTING IN HEART ATTACK. Continue Lipitor and Metoprolol.  Secondary Diagnosis:	Chronic systolic CHF (congestive heart failure)  Secondary Diagnosis:	HTN (hypertension)  Secondary Diagnosis:	HLD (hyperlipidemia) Principal Discharge DX:	CAD (coronary artery disease)  Goal:	Maintain Low Fat, Low Salt and Low Cholesterol diet  Assessment and plan of treatment:	You had a cardiac angiogram with stent placement to one of your heart arteries (Left Circumflex). Continue Aspirin and Plavix . DO NOT STOP TAKING ASPIRIN OR PLAVIX UNLESS INSTRUCTED BY YOUR CARDIOLOGIST AS YOUR STENT CAN CLOSE RESULTING IN HEART ATTACK. Continue Lipitor and Metoprolol and Imdur.  Secondary Diagnosis:	Chronic systolic CHF (congestive heart failure)  Assessment and plan of treatment:	You have decreased heart function (normal heart function is 50-55 yours is 35%) which means your heart does not pump the way it should. Your heart is weak. Weigh yourself daily. If you notice increase of 2 lbs or more and/or shortness of breath and /or leg swelling contact Dr. Ortega and proceed to nearest Emergency Room.  Secondary Diagnosis:	HTN (hypertension)  Assessment and plan of treatment:	Monitor BP./ Maintain Low Salt Diet (Less than 2 g (2000 mg) daily)). Continue Losartan and Metoprolol  Secondary Diagnosis:	HLD (hyperlipidemia)  Assessment and plan of treatment:	Maintain Low Cholesterol Diet. Continue Atorvastatin.

## 2018-08-17 NOTE — DISCHARGE NOTE ADULT - PLAN OF CARE
Maintain Low Fat, Low Salt and Low Cholesterol diet You had a cardiac angiogram with stent placement to one of your heart arteries (Left Circumflex). Continue Aspirin and Plavix . DO NOT STOP TAKING ASPIRIN OR PLAVIX UNLESS INSTRUCTED BY YOUR CARDIOLOGIST AS YOUR STENT CAN CLOSE RESULTING IN HEART ATTACK. Continue Lipitor and Metoprolol. You had a cardiac angiogram with stent placement to one of your heart arteries (Left Circumflex). Continue Aspirin and Plavix . DO NOT STOP TAKING ASPIRIN OR PLAVIX UNLESS INSTRUCTED BY YOUR CARDIOLOGIST AS YOUR STENT CAN CLOSE RESULTING IN HEART ATTACK. Continue Lipitor and Metoprolol and Imdur. You have decreased heart function (normal heart function is 50-55 yours is 35%) which means your heart does not pump the way it should. Your heart is weak. Weigh yourself daily. If you notice increase of 2 lbs or more and/or shortness of breath and /or leg swelling contact Dr. Ortega and proceed to nearest Emergency Room. Monitor BP./ Maintain Low Salt Diet (Less than 2 g (2000 mg) daily)). Continue Losartan and Metoprolol Maintain Low Cholesterol Diet. Continue Atorvastatin.

## 2018-08-17 NOTE — DISCHARGE NOTE ADULT - MEDICATION SUMMARY - MEDICATIONS TO TAKE
I will START or STAY ON the medications listed below when I get home from the hospital:    aspirin 81 mg oral tablet  -- 1 tab(s) by mouth once a day  -- Indication: For COronary Artery Disease (Heart), Stent     losartan 100 mg oral tablet  -- 1 tab(s) by mouth once a day  -- Indication: For Hypertension (Blood Pressure), Chronic Systolic CHF (Decreased Heart Function)     isosorbide mononitrate 30 mg oral tablet, extended release  -- 1 tab(s) by mouth once a day   -- Do not drink alcoholic beverages when taking this medication.  It is very important that you take or use this exactly as directed.  Do not skip doses or discontinue unless directed by your doctor.  Swallow whole.  Do not crush.    -- Indication: For COronary Artery Disease (Heart),     atorvastatin 80 mg oral tablet  -- 1 tab(s) by mouth once a day (at bedtime)  -- Indication: For Hyperlipidemia (Cholesterol) , Coronary Artery Disease (Heart),    clopidogrel 75 mg oral tablet  -- 1 tab(s) by mouth once a day  -- Indication: For COronary Artery Disease (Heart), Stent     metoprolol succinate 50 mg oral tablet, extended release  -- 1 tab(s) by mouth once a day  -- Indication: For Chronic Systolic CHF (Decreased Heart Function) , Hypertension (Blood Pressure)

## 2018-08-17 NOTE — DISCHARGE NOTE ADULT - PATIENT PORTAL LINK FT
You can access the [facility] Patient Portal, offered by [facility], by registering with the following website: [email or web address]

## 2018-08-17 NOTE — DISCHARGE NOTE ADULT - INSTRUCTIONS
You underwent a coronary angiogram and should wait 3 days before returning to ordinary activities. Do not drive for 2 days. Consult your doctor before returning to vigorous activity. You may return to work in 3-5 days. The catheter from your right groin was removed. You may shower once the dressing is removed, but avoid baths, hot tubs, or swimming for 5 days to prevent infection. If you notice bleeding from the site, hardening and pain at the site, drainage or redness from the site, coolness/paleness of the right leg, weakness/paralysis (unable to lift or move) right leg, swelling, or fever, please call [phone]. Please continue your Aspirin and Plavix as prescribed unless otherwise indicated by your cardiologist. All of your prescriptions have been sent to the pharmacy. Please follow up with Dr. Ortega in 1-2 weeks. All of your needed prescriptions have been sent electronically to your pharmacy.

## 2018-08-17 NOTE — DISCHARGE NOTE ADULT - CARE PROVIDER_API CALL
Adelso Lieberman), Cardiovascular Disease; Internal Medicine; Interventional Cardiology  [address] [place] [address]  Phone: [phone]  Fax: [phone]

## 2018-08-17 NOTE — DISCHARGE NOTE ADULT - HOSPITAL COURSE
76 yo Norwegian speaking male, former smoker, former ETOH abuser, with PMHx significant for HTN, Hyperlipidemia, Pre-Diabetes, CKD Stage III -Cr 1.31 on arrival previously 1.08-1.16 in 11/2017), Aortic Valve Sclerosis, Mild to Moderate MR, chronic systolic CHF (EF 35%), LBBB, COPD/Emphysema (no history of intubations or hospitalizations, no home Oxygen),  known CAD with prior PCI/GILBERTO of RCA on 11/10/17 @ [facility] and residual  of LCx (see below), who presented to his cardiologist for routine follow-up.  Pt denies any chest pain, SOB, dizziness, n/v, diaphoresis, orthopnea, PND, palpitations, syncope.  Subsequently, pt underwent a stress test on 7/9/18 which revealed mid to basal lateral wall ankit-infarct ischemia, EF 36%.  Patient s/p cardiac cath  8/16: GILBERTO-->pLCx , patent mRCA stent, EF:36% (NST 7/2018), EDP 9mmHg. Right groin PC (multiple sticks for access). Intraprocedure /100 given Labetalol 10 mg IV x 1 with improvement. Post procedure right groin ooze resolved after 10 minutes of manual pressure. Groin is stable and ooze has not recurred. CKD Stage III -CR 1.3 on arrival and patient hydrated during procedure as well as after and received total of 500 cc during cath. Losartan held this AM 8/17 and Cr improved at 1.21. Patient with adequate BP, urine output, volume status and electrolytes. Labs, telemetry and EKG reviewed. Patient C/P free and HD stable and cleared for discharge by Dr. Tam. Prescriptions for all medications (ASA, Plavix, Metoprolol, Imdur, Losartan and Lipitor) E Prescribed to Pharmacy with refills.     V/S:	BP: 112-122/63		HR: 60s	RR: 16 	Temp: 97.1 F  Monitor-NSR 			O2 sat- 96% RA   	  GENERAL:  Sitting in bed in no acute distress  CVS: + S1 S2. RRR. No murmurs, rubs or gallops.   Pulm: CTA Bilaterally. No rhonchi, wheezes, or rales.  Abd: BS x 4. Soft NT/ND.  Ext: No clubbing, cyanosis, or edema BLE. No calf tenderness BLE.   Right Groin: Soft. Non-tender. No hematoma, bleed or bruit.  Distal Pulses Intact to Baseline

## 2018-08-21 PROCEDURE — 36415 COLL VENOUS BLD VENIPUNCTURE: CPT

## 2018-08-21 PROCEDURE — 85027 COMPLETE CBC AUTOMATED: CPT

## 2018-08-21 PROCEDURE — 85025 COMPLETE CBC W/AUTO DIFF WBC: CPT

## 2018-08-21 PROCEDURE — 80061 LIPID PANEL: CPT

## 2018-08-21 PROCEDURE — 82553 CREATINE MB FRACTION: CPT

## 2018-08-21 PROCEDURE — 82550 ASSAY OF CK (CPK): CPT

## 2018-08-21 PROCEDURE — 80048 BASIC METABOLIC PNL TOTAL CA: CPT

## 2018-08-21 PROCEDURE — 85730 THROMBOPLASTIN TIME PARTIAL: CPT

## 2018-08-21 PROCEDURE — C1894: CPT

## 2018-08-21 PROCEDURE — C1769: CPT

## 2018-08-21 PROCEDURE — C1874: CPT

## 2018-08-21 PROCEDURE — 86140 C-REACTIVE PROTEIN: CPT

## 2018-08-21 PROCEDURE — 93005 ELECTROCARDIOGRAM TRACING: CPT

## 2018-08-21 PROCEDURE — 85610 PROTHROMBIN TIME: CPT

## 2018-08-21 PROCEDURE — C1725: CPT

## 2018-08-21 PROCEDURE — 83036 HEMOGLOBIN GLYCOSYLATED A1C: CPT

## 2018-08-21 PROCEDURE — 83735 ASSAY OF MAGNESIUM: CPT

## 2018-08-21 PROCEDURE — C1887: CPT

## 2018-08-21 PROCEDURE — C1760: CPT

## 2018-08-23 DIAGNOSIS — I44.7 LEFT BUNDLE-BRANCH BLOCK, UNSPECIFIED: ICD-10-CM

## 2018-08-23 DIAGNOSIS — R73.03 PREDIABETES: ICD-10-CM

## 2018-08-23 DIAGNOSIS — I34.0 NONRHEUMATIC MITRAL (VALVE) INSUFFICIENCY: ICD-10-CM

## 2018-08-23 DIAGNOSIS — I35.8 OTHER NONRHEUMATIC AORTIC VALVE DISORDERS: ICD-10-CM

## 2018-08-23 DIAGNOSIS — I25.118 ATHEROSCLEROTIC HEART DISEASE OF NATIVE CORONARY ARTERY WITH OTHER FORMS OF ANGINA PECTORIS: ICD-10-CM

## 2018-08-23 DIAGNOSIS — N18.3 CHRONIC KIDNEY DISEASE, STAGE 3 (MODERATE): ICD-10-CM

## 2018-08-23 DIAGNOSIS — E78.5 HYPERLIPIDEMIA, UNSPECIFIED: ICD-10-CM

## 2018-08-23 DIAGNOSIS — J43.9 EMPHYSEMA, UNSPECIFIED: ICD-10-CM

## 2018-08-23 DIAGNOSIS — Z79.82 LONG TERM (CURRENT) USE OF ASPIRIN: ICD-10-CM

## 2018-08-23 DIAGNOSIS — Z95.5 PRESENCE OF CORONARY ANGIOPLASTY IMPLANT AND GRAFT: ICD-10-CM

## 2018-08-23 DIAGNOSIS — Z87.891 PERSONAL HISTORY OF NICOTINE DEPENDENCE: ICD-10-CM

## 2018-08-23 DIAGNOSIS — I50.22 CHRONIC SYSTOLIC (CONGESTIVE) HEART FAILURE: ICD-10-CM

## 2018-08-23 DIAGNOSIS — I42.9 CARDIOMYOPATHY, UNSPECIFIED: ICD-10-CM

## 2018-08-23 DIAGNOSIS — I13.0 HYPERTENSIVE HEART AND CHRONIC KIDNEY DISEASE WITH HEART FAILURE AND STAGE 1 THROUGH STAGE 4 CHRONIC KIDNEY DISEASE, OR UNSPECIFIED CHRONIC KIDNEY DISEASE: ICD-10-CM
